# Patient Record
Sex: FEMALE | Race: WHITE | NOT HISPANIC OR LATINO | Employment: FULL TIME | URBAN - METROPOLITAN AREA
[De-identification: names, ages, dates, MRNs, and addresses within clinical notes are randomized per-mention and may not be internally consistent; named-entity substitution may affect disease eponyms.]

---

## 2017-04-18 DIAGNOSIS — Z01.812 PRE-OPERATIVE LABORATORY EXAMINATION: ICD-10-CM

## 2017-04-18 DIAGNOSIS — Z01.83 ENCOUNTER FOR BLOOD TYPING: ICD-10-CM

## 2017-04-18 LAB
ABO GROUP BLD: NORMAL
ALBUMIN SERPL BCP-MCNC: 4.2 G/DL (ref 3.2–4.9)
ALBUMIN/GLOB SERPL: 1.6 G/DL
ALP SERPL-CCNC: 111 U/L (ref 30–99)
ALT SERPL-CCNC: 17 U/L (ref 2–50)
ANION GAP SERPL CALC-SCNC: 7 MMOL/L (ref 0–11.9)
APTT PPP: 22.6 SEC (ref 24.7–36)
AST SERPL-CCNC: 19 U/L (ref 12–45)
BASOPHILS # BLD AUTO: 0.7 % (ref 0–1.8)
BASOPHILS # BLD: 0.04 K/UL (ref 0–0.12)
BILIRUB SERPL-MCNC: 0.5 MG/DL (ref 0.1–1.5)
BLD GP AB SCN SERPL QL: NORMAL
BUN SERPL-MCNC: 10 MG/DL (ref 8–22)
CALCIUM SERPL-MCNC: 9.5 MG/DL (ref 8.5–10.5)
CHLORIDE SERPL-SCNC: 107 MMOL/L (ref 96–112)
CO2 SERPL-SCNC: 25 MMOL/L (ref 20–33)
CREAT SERPL-MCNC: 0.81 MG/DL (ref 0.5–1.4)
EOSINOPHIL # BLD AUTO: 0.17 K/UL (ref 0–0.51)
EOSINOPHIL NFR BLD: 2.9 % (ref 0–6.9)
ERYTHROCYTE [DISTWIDTH] IN BLOOD BY AUTOMATED COUNT: 45.4 FL (ref 35.9–50)
GFR SERPL CREATININE-BSD FRML MDRD: >60 ML/MIN/1.73 M 2
GLOBULIN SER CALC-MCNC: 2.6 G/DL (ref 1.9–3.5)
GLUCOSE SERPL-MCNC: 96 MG/DL (ref 65–99)
HCG SERPL QL: NEGATIVE
HCT VFR BLD AUTO: 39.1 % (ref 37–47)
HGB BLD-MCNC: 13.3 G/DL (ref 12–16)
IMM GRANULOCYTES # BLD AUTO: 0.01 K/UL (ref 0–0.11)
IMM GRANULOCYTES NFR BLD AUTO: 0.2 % (ref 0–0.9)
INR PPP: 0.91 (ref 0.87–1.13)
LYMPHOCYTES # BLD AUTO: 1.53 K/UL (ref 1–4.8)
LYMPHOCYTES NFR BLD: 25.9 % (ref 22–41)
MCH RBC QN AUTO: 32.5 PG (ref 27–33)
MCHC RBC AUTO-ENTMCNC: 34 G/DL (ref 33.6–35)
MCV RBC AUTO: 95.6 FL (ref 81.4–97.8)
MONOCYTES # BLD AUTO: 0.44 K/UL (ref 0–0.85)
MONOCYTES NFR BLD AUTO: 7.4 % (ref 0–13.4)
NEUTROPHILS # BLD AUTO: 3.72 K/UL (ref 2–7.15)
NEUTROPHILS NFR BLD: 62.9 % (ref 44–72)
NRBC # BLD AUTO: 0 K/UL
NRBC BLD AUTO-RTO: 0 /100 WBC
PLATELET # BLD AUTO: 235 K/UL (ref 164–446)
PMV BLD AUTO: 10.7 FL (ref 9–12.9)
POTASSIUM SERPL-SCNC: 3.6 MMOL/L (ref 3.6–5.5)
PROT SERPL-MCNC: 6.8 G/DL (ref 6–8.2)
PROTHROMBIN TIME: 12.5 SEC (ref 12–14.6)
RBC # BLD AUTO: 4.09 M/UL (ref 4.2–5.4)
RH BLD: NORMAL
SODIUM SERPL-SCNC: 139 MMOL/L (ref 135–145)
WBC # BLD AUTO: 5.9 K/UL (ref 4.8–10.8)

## 2017-04-18 PROCEDURE — 86850 RBC ANTIBODY SCREEN: CPT

## 2017-04-18 PROCEDURE — 36415 COLL VENOUS BLD VENIPUNCTURE: CPT

## 2017-04-18 PROCEDURE — 85730 THROMBOPLASTIN TIME PARTIAL: CPT

## 2017-04-18 PROCEDURE — 84703 CHORIONIC GONADOTROPIN ASSAY: CPT

## 2017-04-18 PROCEDURE — 80053 COMPREHEN METABOLIC PANEL: CPT

## 2017-04-18 PROCEDURE — 85025 COMPLETE CBC W/AUTO DIFF WBC: CPT

## 2017-04-18 PROCEDURE — 86901 BLOOD TYPING SEROLOGIC RH(D): CPT

## 2017-04-18 PROCEDURE — 85610 PROTHROMBIN TIME: CPT

## 2017-04-18 PROCEDURE — 86900 BLOOD TYPING SEROLOGIC ABO: CPT

## 2017-04-18 RX ORDER — M-VIT,TX,IRON,MINS/CALC/FOLIC 27MG-0.4MG
1 TABLET ORAL DAILY
COMMUNITY

## 2017-04-18 RX ORDER — TAMOXIFEN CITRATE 20 MG/1
20 TABLET ORAL EVERY EVENING
COMMUNITY

## 2017-04-20 ENCOUNTER — HOSPITAL ENCOUNTER (OUTPATIENT)
Facility: MEDICAL CENTER | Age: 37
End: 2017-04-20
Attending: SPECIALIST | Admitting: SPECIALIST
Payer: COMMERCIAL

## 2017-04-20 VITALS
SYSTOLIC BLOOD PRESSURE: 100 MMHG | HEIGHT: 64 IN | WEIGHT: 137.79 LBS | BODY MASS INDEX: 23.52 KG/M2 | RESPIRATION RATE: 16 BRPM | HEART RATE: 94 BPM | OXYGEN SATURATION: 97 % | TEMPERATURE: 99.3 F | DIASTOLIC BLOOD PRESSURE: 76 MMHG

## 2017-04-20 PROBLEM — R97.1 ELEVATED CANCER ANTIGEN 125 (CA 125): Status: ACTIVE | Noted: 2017-04-20

## 2017-04-20 PROBLEM — R19.01 RIGHT UPPER QUADRANT ABDOMINAL MASS: Status: ACTIVE | Noted: 2017-04-20

## 2017-04-20 LAB
ABO GROUP BLD: NORMAL
ANION GAP SERPL CALC-SCNC: 13 MMOL/L (ref 0–11.9)
BUN SERPL-MCNC: 10 MG/DL (ref 8–22)
CALCIUM SERPL-MCNC: 9.9 MG/DL (ref 8.5–10.5)
CHLORIDE SERPL-SCNC: 107 MMOL/L (ref 96–112)
CO2 SERPL-SCNC: 20 MMOL/L (ref 20–33)
CREAT SERPL-MCNC: 0.55 MG/DL (ref 0.5–1.4)
GFR SERPL CREATININE-BSD FRML MDRD: >60 ML/MIN/1.73 M 2
GLUCOSE SERPL-MCNC: 81 MG/DL (ref 65–99)
POTASSIUM SERPL-SCNC: 3.8 MMOL/L (ref 3.6–5.5)
SODIUM SERPL-SCNC: 140 MMOL/L (ref 135–145)

## 2017-04-20 PROCEDURE — 110371 HCHG SHELL REV 272: Performed by: SPECIALIST

## 2017-04-20 PROCEDURE — 700101 HCHG RX REV CODE 250

## 2017-04-20 PROCEDURE — 501664 HCHG TUBING, FILTER STRYKER: Performed by: SPECIALIST

## 2017-04-20 PROCEDURE — 160035 HCHG PACU - 1ST 60 MINS PHASE I: Performed by: SPECIALIST

## 2017-04-20 PROCEDURE — 500025 HCHG ARMREST, CRADLE FOAM: Performed by: SPECIALIST

## 2017-04-20 PROCEDURE — 88305 TISSUE EXAM BY PATHOLOGIST: CPT

## 2017-04-20 PROCEDURE — 160025 RECOVERY II MINUTES (STATS): Performed by: SPECIALIST

## 2017-04-20 PROCEDURE — 80048 BASIC METABOLIC PNL TOTAL CA: CPT

## 2017-04-20 PROCEDURE — 160009 HCHG ANES TIME/MIN: Performed by: SPECIALIST

## 2017-04-20 PROCEDURE — 160042 HCHG SURGERY MINUTES - EA ADDL 1 MIN LEVEL 5: Performed by: SPECIALIST

## 2017-04-20 PROCEDURE — 160002 HCHG RECOVERY MINUTES (STAT): Performed by: SPECIALIST

## 2017-04-20 PROCEDURE — 110382 HCHG SHELL REV 271: Performed by: SPECIALIST

## 2017-04-20 PROCEDURE — 51798 US URINE CAPACITY MEASURE: CPT

## 2017-04-20 PROCEDURE — 502714 HCHG ROBOTIC SURGERY SERVICES: Performed by: SPECIALIST

## 2017-04-20 PROCEDURE — 160046 HCHG PACU - 1ST 60 MINS PHASE II: Performed by: SPECIALIST

## 2017-04-20 PROCEDURE — 500864 HCHG NEEDLE, SPINAL 18G: Performed by: SPECIALIST

## 2017-04-20 PROCEDURE — A4606 OXYGEN PROBE USED W OXIMETER: HCPCS | Performed by: SPECIALIST

## 2017-04-20 PROCEDURE — 700111 HCHG RX REV CODE 636 W/ 250 OVERRIDE (IP)

## 2017-04-20 PROCEDURE — 501582 HCHG TROCAR, THRD BLADED: Performed by: SPECIALIST

## 2017-04-20 PROCEDURE — 500854 HCHG NEEDLE, INSUFFLATION FOR STEP: Performed by: SPECIALIST

## 2017-04-20 PROCEDURE — 88307 TISSUE EXAM BY PATHOLOGIST: CPT

## 2017-04-20 PROCEDURE — A9270 NON-COVERED ITEM OR SERVICE: HCPCS

## 2017-04-20 PROCEDURE — 160036 HCHG PACU - EA ADDL 30 MINS PHASE I: Performed by: SPECIALIST

## 2017-04-20 PROCEDURE — 500700 HCHG HEMOCLIP, SMALL (RED): Performed by: SPECIALIST

## 2017-04-20 PROCEDURE — 502240 HCHG MISC OR SUPPLY RC 0272: Performed by: SPECIALIST

## 2017-04-20 PROCEDURE — 160031 HCHG SURGERY MINUTES - 1ST 30 MINS LEVEL 5: Performed by: SPECIALIST

## 2017-04-20 PROCEDURE — 502779 HCHG SUTURE, QUILL: Performed by: SPECIALIST

## 2017-04-20 PROCEDURE — 501838 HCHG SUTURE GENERAL: Performed by: SPECIALIST

## 2017-04-20 PROCEDURE — 700102 HCHG RX REV CODE 250 W/ 637 OVERRIDE(OP)

## 2017-04-20 PROCEDURE — 160048 HCHG OR STATISTICAL LEVEL 1-5: Performed by: SPECIALIST

## 2017-04-20 PROCEDURE — 160047 HCHG PACU  - EA ADDL 30 MINS PHASE II: Performed by: SPECIALIST

## 2017-04-20 RX ORDER — HYDROMORPHONE HYDROCHLORIDE 2 MG/1
2-4 TABLET ORAL
Status: DISCONTINUED | OUTPATIENT
Start: 2017-04-20 | End: 2017-04-20

## 2017-04-20 RX ORDER — CELECOXIB 200 MG/1
CAPSULE ORAL
Status: COMPLETED
Start: 2017-04-20 | End: 2017-04-20

## 2017-04-20 RX ORDER — SODIUM CHLORIDE, SODIUM LACTATE, POTASSIUM CHLORIDE, CALCIUM CHLORIDE 600; 310; 30; 20 MG/100ML; MG/100ML; MG/100ML; MG/100ML
1000 INJECTION, SOLUTION INTRAVENOUS
Status: COMPLETED | OUTPATIENT
Start: 2017-04-20 | End: 2017-04-20

## 2017-04-20 RX ORDER — METOCLOPRAMIDE HYDROCHLORIDE 5 MG/ML
10 INJECTION INTRAMUSCULAR; INTRAVENOUS ONCE
Status: COMPLETED | OUTPATIENT
Start: 2017-04-20 | End: 2017-04-20

## 2017-04-20 RX ORDER — HYDROMORPHONE HYDROCHLORIDE 2 MG/1
TABLET ORAL
Status: COMPLETED
Start: 2017-04-20 | End: 2017-04-20

## 2017-04-20 RX ORDER — METOCLOPRAMIDE 10 MG/1
10 TABLET ORAL EVERY 6 HOURS PRN
Qty: 30 TAB | Refills: 0 | Status: SHIPPED | OUTPATIENT
Start: 2017-04-20

## 2017-04-20 RX ORDER — SCOLOPAMINE TRANSDERMAL SYSTEM 1 MG/1
PATCH, EXTENDED RELEASE TRANSDERMAL
Status: DISCONTINUED
Start: 2017-04-20 | End: 2017-04-20 | Stop reason: HOSPADM

## 2017-04-20 RX ORDER — ONDANSETRON 2 MG/ML
4 INJECTION INTRAMUSCULAR; INTRAVENOUS EVERY 4 HOURS PRN
Status: DISCONTINUED | OUTPATIENT
Start: 2017-04-20 | End: 2017-04-20 | Stop reason: HOSPADM

## 2017-04-20 RX ORDER — ONDANSETRON 4 MG/1
4 TABLET, ORALLY DISINTEGRATING ORAL EVERY 8 HOURS PRN
Qty: 30 TAB | Refills: 0 | Status: SHIPPED | OUTPATIENT
Start: 2017-04-20

## 2017-04-20 RX ORDER — HYDROMORPHONE HYDROCHLORIDE 2 MG/1
2-4 TABLET ORAL
Qty: 60 TAB | Refills: 0 | Status: SHIPPED | OUTPATIENT
Start: 2017-04-20

## 2017-04-20 RX ORDER — HYDROMORPHONE HYDROCHLORIDE 2 MG/1
4 TABLET ORAL
Status: DISCONTINUED | OUTPATIENT
Start: 2017-04-20 | End: 2017-04-20 | Stop reason: HOSPADM

## 2017-04-20 RX ORDER — DIPHENHYDRAMINE HYDROCHLORIDE 50 MG/ML
INJECTION INTRAMUSCULAR; INTRAVENOUS
Status: COMPLETED
Start: 2017-04-20 | End: 2017-04-20

## 2017-04-20 RX ORDER — KETOROLAC TROMETHAMINE 30 MG/ML
30 INJECTION, SOLUTION INTRAMUSCULAR; INTRAVENOUS EVERY 6 HOURS PRN
Status: DISCONTINUED | OUTPATIENT
Start: 2017-04-20 | End: 2017-04-20 | Stop reason: HOSPADM

## 2017-04-20 RX ORDER — LIDOCAINE HYDROCHLORIDE 10 MG/ML
INJECTION, SOLUTION INFILTRATION; PERINEURAL
Status: COMPLETED
Start: 2017-04-20 | End: 2017-04-20

## 2017-04-20 RX ORDER — BUPIVACAINE HYDROCHLORIDE AND EPINEPHRINE 2.5; 5 MG/ML; UG/ML
INJECTION, SOLUTION EPIDURAL; INFILTRATION; INTRACAUDAL; PERINEURAL
Status: DISCONTINUED | OUTPATIENT
Start: 2017-04-20 | End: 2017-04-20 | Stop reason: HOSPADM

## 2017-04-20 RX ORDER — METOCLOPRAMIDE HYDROCHLORIDE 5 MG/ML
INJECTION INTRAMUSCULAR; INTRAVENOUS
Status: COMPLETED
Start: 2017-04-20 | End: 2017-04-20

## 2017-04-20 RX ORDER — HYDROMORPHONE HYDROCHLORIDE 2 MG/1
2 TABLET ORAL
Status: DISCONTINUED | OUTPATIENT
Start: 2017-04-20 | End: 2017-04-20 | Stop reason: HOSPADM

## 2017-04-20 RX ORDER — ONDANSETRON 2 MG/ML
4 INJECTION INTRAMUSCULAR; INTRAVENOUS EVERY 6 HOURS PRN
Status: CANCELLED | OUTPATIENT
Start: 2017-04-20

## 2017-04-20 RX ORDER — ACETAMINOPHEN 500 MG
TABLET ORAL
Status: COMPLETED
Start: 2017-04-20 | End: 2017-04-20

## 2017-04-20 RX ORDER — HYDROMORPHONE HYDROCHLORIDE 2 MG/ML
1 INJECTION, SOLUTION INTRAMUSCULAR; INTRAVENOUS; SUBCUTANEOUS
Status: DISCONTINUED | OUTPATIENT
Start: 2017-04-20 | End: 2017-04-20 | Stop reason: HOSPADM

## 2017-04-20 RX ORDER — SCOLOPAMINE TRANSDERMAL SYSTEM 1 MG/1
1 PATCH, EXTENDED RELEASE TRANSDERMAL
Status: DISCONTINUED | OUTPATIENT
Start: 2017-04-20 | End: 2017-04-20 | Stop reason: HOSPADM

## 2017-04-20 RX ADMIN — DIPHENHYDRAMINE HYDROCHLORIDE 12.5 MG: 50 INJECTION INTRAMUSCULAR; INTRAVENOUS at 13:52

## 2017-04-20 RX ADMIN — SCOLOPAMINE TRANSDERMAL SYSTEM 1 PATCH: 1 PATCH, EXTENDED RELEASE TRANSDERMAL at 10:15

## 2017-04-20 RX ADMIN — HYDROMORPHONE HYDROCHLORIDE 2 MG: 2 TABLET ORAL at 19:30

## 2017-04-20 RX ADMIN — FENTANYL CITRATE 25 MCG: 50 INJECTION, SOLUTION INTRAMUSCULAR; INTRAVENOUS at 13:30

## 2017-04-20 RX ADMIN — DIPHENHYDRAMINE HYDROCHLORIDE 12.5 MG: 50 INJECTION INTRAMUSCULAR; INTRAVENOUS at 13:15

## 2017-04-20 RX ADMIN — CELECOXIB: 200 CAPSULE ORAL at 10:15

## 2017-04-20 RX ADMIN — ACETAMINOPHEN 1000 MG: 500 TABLET, FILM COATED ORAL at 10:15

## 2017-04-20 RX ADMIN — LIDOCAINE HYDROCHLORIDE: 10 INJECTION, SOLUTION INFILTRATION; PERINEURAL at 09:55

## 2017-04-20 RX ADMIN — METOCLOPRAMIDE HYDROCHLORIDE 10 MG: 5 INJECTION INTRAMUSCULAR; INTRAVENOUS at 16:08

## 2017-04-20 RX ADMIN — HYDROMORPHONE HYDROCHLORIDE 1 MG: 2 TABLET ORAL at 13:20

## 2017-04-20 RX ADMIN — METOCLOPRAMIDE 10 MG: 5 INJECTION, SOLUTION INTRAMUSCULAR; INTRAVENOUS at 16:08

## 2017-04-20 RX ADMIN — SCOPALAMINE 1 PATCH: 1 PATCH, EXTENDED RELEASE TRANSDERMAL at 10:15

## 2017-04-20 RX ADMIN — SODIUM CHLORIDE, SODIUM LACTATE, POTASSIUM CHLORIDE, CALCIUM CHLORIDE 1000 ML: 600; 310; 30; 20 INJECTION, SOLUTION INTRAVENOUS at 09:55

## 2017-04-20 RX ADMIN — HYDROMORPHONE HYDROCHLORIDE 25 MG: 2 TABLET ORAL at 13:40

## 2017-04-20 ASSESSMENT — PAIN SCALES - GENERAL
PAINLEVEL_OUTOF10: 3
PAINLEVEL_OUTOF10: 3
PAINLEVEL_OUTOF10: 2
PAINLEVEL_OUTOF10: 1
PAINLEVEL_OUTOF10: 2
PAINLEVEL_OUTOF10: 2

## 2017-04-20 NOTE — IP AVS SNAPSHOT
4/20/2017    Zaida Renny  [address] [place] [address]    Dear Zaida:    [facility] wants to ensure your discharge home is safe and you or your loved ones have had all of your questions answered regarding your care after you leave the hospital.    Below is a list of resources and contact information should you have any questions regarding your hospital stay, follow-up instructions, or active medical symptoms.    Questions or Concerns Regarding… Contact   Medical Questions Related to Your Discharge  (7 days a week, 8am-5pm) Contact a Nurse Care Coordinator   [phone]   Medical Questions Not Related to Your Discharge  (24 hours a day / 7 days a week)  Contact the Nurse Health Line   [phone]    Medications or Discharge Instructions Refer to your discharge packet   or contact your [facility] Primary Care Provider   [phone]   Follow-up Appointment(s) Schedule your appointment via [employer]   or contact Scheduling [phone]   Billing Review your statement via [employer]  or contact Billing [phone]   Medical Records Review your records via [employer]   or contact Medical Records [phone]     You may receive a telephone call within two days of discharge. This call is to make certain you understand your discharge instructions and have the opportunity to have any questions answered. You can also easily access your medical information, test results and upcoming appointments via the [employer] free online health management tool. You can learn more and sign up at [employer]. For assistance setting up your [employer] account, please call [phone].    Once again, we want to ensure your discharge home is safe and that you have a clear understanding of any next steps in your care. If you have any questions or concerns, please do not hesitate to contact us, we are here for you. Thank you for choosing [facility] for your healthcare needs.    Sincerely,    Your [facility] Healthcare Team

## 2017-04-20 NOTE — OR NURSING
Pt AOX4. Dressings CDI. Pt able to ambulate in hallway and tolerated pain. She tried to use the bathroom but unsuccessful for now. Pt back in bed. Family at bedside.      1555: Pt has c/o nausea and is asking for medication. She reports zofran to be not effective for her. Called DR. Alonso in OR and asked for order. He ordered Reglan 10mg IV once.     1720: Pt tried to void in the bathroom again but is still unable to go. Bladder scan result 128ml. Paged Dr. Traore to notify that pt is unable to void. Waiting for call back.     1812: Dr. Chin called back and is aware that pt is not able to void. He ordered to bolus pt with 1L LR. Give her another 2 hours and call him back if pt is still unsuccessful with voiding.

## 2017-04-20 NOTE — DISCHARGE INSTRUCTIONS
ACTIVITY: Rest and take it easy for the first 24 hours.  A responsible adult is recommended to remain with you during that time.  It is normal to feel sleepy.  We encourage you to not do anything that requires balance, judgment or coordination.    MILD FLU-LIKE SYMPTOMS ARE NORMAL. YOU MAY EXPERIENCE GENERALIZED MUSCLE ACHES, THROAT IRRITATION, HEADACHE AND/OR SOME NAUSEA.    FOR 24 HOURS DO NOT:  Drive, operate machinery or run household appliances.  Drink beer or alcoholic beverages.   Make important decisions or sign legal documents.    SPECIAL INSTRUCTIONS: *  1. No heavy lifting greater than 10 pounds for minimum 6 weeks  2. Nothing in vagina (ie no tampons, douching, intercourse) for minimum of 6 weeks  3. No driving while taking narcotics   4. Return to our office as directed and call to confirm appointment  Call our office [phone] if you develop any fevers, chills, nausea/vomiting, heavy vaginal bleeding, or redness, tenderness, and/or drainage from your wound, if you have persistent watery discharge while ambulating or stool draining from the vagina .  5. Showering is ok after shower make sure wound is dry. No tub baths or soaking of incision area until surgeon gives approval.  6. You may keep the wound dressing and change everyday. After 2 weeks from surgery you may keep the wound dressing off.   7. You may have vaginal spotting or light bleeding which is normal.  8. If you have not had a bowel movement for 2 days, please take over the counter Milk of Magnesium, 1 tablespoon every 4 hours. After 4 doses and if you still have not had a bowel movement, please call your doctor.   9. You may eat soft diet, such as soup, liquid, for day #1 and if tolerating you may resume your regular diet.**    DIET: To avoid nausea, slowly advance diet as tolerated, avoiding spicy or greasy foods for the first day.  Add more substantial food to your diet according to your physician's instructions.  Babies can be fed  formula or breast milk as soon as they are hungry.  INCREASE FLUIDS AND FIBER TO AVOID CONSTIPATION.    SURGICAL DRESSING/BATHING: *see above instructions    FOLLOW-UP APPOINTMENT:  A follow-up appointment should be arranged with your doctor.  Call to schedule.    You should CALL YOUR PHYSICIAN if you develop:  Fever greater than 101 degrees F.  Pain not relieved by medication, or persistent nausea or vomiting.  Excessive bleeding (blood soaking through dressing) or unexpected drainage from the wound.  Extreme redness or swelling around the incision site, drainage of pus or foul smelling drainage.  Inability to urinate or empty your bladder within 8 hours.  Problems with breathing or chest pain.    You should call 911 if you develop problems with breathing or chest pain.  If you are unable to contact your doctor or surgical center, you should go to the nearest emergency room or urgent care center.  Physician's telephone #: **Dr. Traore [phone]*    If any questions arise, call your doctor.  If your doctor is not available, please feel free to call the Surgical Center at [phone].  The Center is open Monday through Friday from 7AM to 7PM.  You can also call the [facility] HOTLINE open 24 hours/day, 7 days/week and speak to a nurse at [phone], or toll free at [phone].    A registered nurse may call you a few days after your surgery to see how you are doing after your procedure.    MEDICATIONS: Resume taking daily medication.  Take prescribed pain medication with food.  If no medication is prescribed, you may take non-aspirin pain medication if needed.  PAIN MEDICATION CAN BE VERY CONSTIPATING.  Take a stool softener or laxative such as senokot, pericolace, or milk of magnesia if needed.    Prescription given for Dilaudid, Reglan, & Zofran.  Last pain medication given at 1:40pm.    If your physician has prescribed pain medication that includes Acetaminophen (Tylenol), do not take additional  Acetaminophen (Tylenol) while taking the prescribed medication.    Depression / Suicide Risk    As you are discharged from this [facility] facility, it is important to learn how to keep safe from harming yourself.    Recognize the warning signs:  · Abrupt changes in personality, positive or negative- including increase in energy   · Giving away possessions  · Change in eating patterns- significant weight changes-  positive or negative  · Change in sleeping patterns- unable to sleep or sleeping all the time   · Unwillingness or inability to communicate  · Depression  · Unusual sadness, discouragement and loneliness  · Talk of wanting to die  · Neglect of personal appearance   · Rebelliousness- reckless behavior  · Withdrawal from people/activities they love  · Confusion- inability to concentrate     If you or a loved one observes any of these behaviors or has concerns about self-harm, here's what you can do:  · Talk about it- your feelings and reasons for harming yourself  · Remove any means that you might use to hurt yourself (examples: pills, rope, extension cords, firearm)  · Get professional help from the community (Mental Health, Substance Abuse, psychological counseling)  · Do not be alone:Call your Safe Contact- someone whom you trust who will be there for you.  · Call your local CRISIS HOTLINE 551-6480 or 469-905-2632  · Call your local Children's Mobile Crisis Response Team [place] (681) 334-2377 or www.[employer]  · Call the toll free National Suicide Prevention Hotlines   · National Suicide Prevention Lifeline 074-246-TDXN (4867)  · National Hope Line Network 800-SUICIDE (706-9941)

## 2017-04-20 NOTE — OR SURGEON
Immediate Post-Operative Note      PreOp Diagnosis: BRCA 2 deleterious mutation and elevated     PostOp Diagnosis: same    Procedure(s):  HYSTERECTOMY ROBOTIC XI-BSO WITH PERITONEAL BIOPSY - Wound Class: Clean Contaminated    Surgeon(s):  NICK Manzano M.D.    Anesthesiologist/Type of Anesthesia:  Anesthesiologist: Zachary Alonso M.D./General    Surgical Staff:  Assistant: AG Hercules  Circulator: Renetta Isabel R.N.  Relief Circulator: Lamin Alvarado R.N.  Scrub Person: tSacy Vazquez    Specimen: uterus, cervix, right and left tubes and ovaries, peritoneal biopsy    Estimated Blood Loss: 25    Findings: pelvic endometriosis, nodules x 2 on right diaphragmatic peritoneum, normal right and left tubes and ovaries.   No evidence of metastatic disease in the pelvis and upper abdomen.    Complications: none        4/20/2017 1:03 PM Juan Parks

## 2017-04-20 NOTE — OP REPORT
DATE OF SERVICE:  04/20/2017    PREOPERATIVE DIAGNOSES:  1.  Deleterious BRCA2 mutation.  2.  Elevated CA-125.  3.  History of breast cancer.    POSTOPERATIVE DIAGNOSES:  1.  BRCA2 deleterious mutation.  2.  Elevated CA-125.  3.  Endometriosis.    PROCEDURE PERFORMED:  Robotic-assisted hysterectomy with bilateral   salpingo-oophorectomy.    SURGEON:  Kike Traore MD    ASSISTANT:  Juan Parks M.D. and. Jacque Casas PA-C.    ANESTHESIOLOGIST:  Zachary Alonso MD.    SPECIMEN SENT OFF TO PATHOLOGY:  Uterus, cervix, right and left tubes and   ovaries and a right diaphragmatic peritoneal biopsy.    ESTIMATED BLOOD LOSS:  25 mL    FINDINGS:  There was no evidence of malignant disease in the pelvis, upper   abdomen and stomach.  The surface of the liver was smooth and there were 2   small benign appearing nodules on the surface of the right diaphragmatic   peritoneum.  One of them was resected and sent off to pathology for permanent   evaluation.  Both right and left tubes and ovaries appeared normal with no   evidence of any malignant disease on the surface of the tubes or the ovaries.    There was a small patch of endometriosis, maybe 0.5 in the area, which was   approximately 0.5 cm in diameter with 2 small nodules less than 2 mm each.    There was no puckering of the uterosacral ligaments.  There was no adhesive   disease in the pelvis secondary to endometriosis.  The appendix appeared   normal and the large bowel and surface of the stomach was also normal in   appearance.  There were no complications.    PROCEDURE IN DETAIL:  The patient was seen in the preoperative waiting area.    Risks, benefits and alternatives of the procedure were reviewed with the   patient.  The patient agreed to proceed with surgery.  The patient was taken   to the operating room where she was placed in the supine position and general   endotracheal anesthesia was administered.  Once _____ was deemed to be   adequate, the  patient was then prepped and draped in usual sterile fashion and   placed in low dorsal lithotomy position and a Martell catheter was placed in   the bladder using sterile technique.  Then, an 8 mm skin incision was made at   the umbilicus after the patient safety timeout and a Veress needle was   introduced into the intraabdominal cavity.  Intraperitoneal placement was   confirmed with a drop in CO2 pressure and double click on the Veress needle   and the abdomen was insufflated with approximately 3 liters of CO2 gas.  Then,   an 8 mm operative port was placed into the intraabdominal cavity.  Under   direct visualization, we did a lower pelvic port placement with 2 ports on the   left lower quadrant and 1 port on the right lower quadrant.  Then, the robot   was docked and under direct visualization, the instruments were placed into   the pelvis.    We took a laparoscopic Maryland forceps and an EndoShears and resected the   nodule in the right diaphragmatic peritoneum and sent off to pathology for   permanent evaluation.    Once instruments were in the pelvis and placed under direct visualization, the   left retroperitoneal space was entered and the peritoneal incision was   extended anteroposteriorly along the ovarian vessels on the left hand side.    The left pararectal space was developed and the ureter was identified and the   ovarian vessels were then isolated.  The ovarian vessel was then clamped,   cauterized and transected with the Maryland bipolar forceps and the posterior   leaf of the broad ligament was taken down all the way down to the uterosacral   ligament and the uterosacral ligament was then transected.  The incision was   extended anteriorly all the way up to the round ligament on the left hand side   and the peritoneal incision was extended anteriorly and the bladder was   dissected off the anterior aspect of the uterus using the monopolar spatula   cautery.  Then, attention was turned to the right  hand side in a similar   fashion, the right retroperitoneal space was entered.  The peritoneal incision   was extended anteroposteriorly along the ovarian vessels and the right   pararectal space was developed and the ureter was identified.  The ovarian   vessels were then isolated and noted to be away from the ureter and the   bipolar cautery was utilized to cauterize the ovarian vessels on the right   hand side and transected with a spatula monopolar cautery.    Then the peritoneal incision was extended anteriorly and the round ligament   was transected on the right hand side and hemostasis was deemed to be   adequate.  The peritoneal incision was then extended anteriorly and the   bladder was dissected off the anterior aspect of the uterus.  Then, both   uterine arteries were isolated, clamped, cauterized and transected with the   bipolar cautery and the uterine vessel was then transected and serial   parametrial bites were taken all the way down to the level of the bladder   pillar.  Then, the bladder pillar on the right and left hand side was clamped,   cauterized, and transected and the bladder was dissected off the anterior   aspect of the uterus all the way up to the level of the top of the vagina.    Then, both uterosacral ligaments were then clamped, cauterized and transected   and the vagina was entered posteriorly on the left hand side.  The vaginal   incision was then extended in a circumferential fashion and the uterus,   cervix, right and left tubes and ovaries were amputated off the upper vagina   and delivered to the vagina and sent off to pathology for permanent   evaluation.  The vaginal cuff was then closed with an 0 Quill in a running   fashion in 2 layers and sutured in the center.  The pelvis was then irrigated.    Excellent hemostasis was noted.  The instruments were removed from the   abdomen and the robot was undocked.  The abdomen was exsufflated and 30 mL of   0.5% Marcaine with  epinephrine was injected at the surgical sites.  The skin   incisions were then closed with a 2-0 Monocryl in a subcuticular fashion.  The   sponges were removed from the vagina.  The Martell catheter was removed.  This   patient was reversed from general anesthesia and taken to recovery room in   stable condition.  Sponge, lap, needle, instrument count were correct x2.       ____________________________________     NICK Webber / GASTON    DD:  04/20/2017 13:26:31  DT:  04/20/2017 14:00:24    D#:  [id number]  Job#:  [id number]

## 2017-04-20 NOTE — IP AVS SNAPSHOT
" Home Care Instructions                                                                                                                Name:Zaida Lawson  Medical Record Number:[id number]  CSN: [id number]    YOB: 1980   Age: 36 y.o.  Sex: female  HT:1.626 m (5' 4\") WT: 62.5 kg (137 lb 12.6 oz)          Admit Date: 4/20/2017     Discharge Date:   Today's Date: 4/20/2017  Attending Doctor:  Kike Traore M.D.                  Allergies:  Ciprofloxacin; Gluten meal; Hydrocodone; Oxycodone; Tramadol; and Wheat bran                Discharge Instructions         ACTIVITY: Rest and take it easy for the first 24 hours.  A responsible adult is recommended to remain with you during that time.  It is normal to feel sleepy.  We encourage you to not do anything that requires balance, judgment or coordination.    MILD FLU-LIKE SYMPTOMS ARE NORMAL. YOU MAY EXPERIENCE GENERALIZED MUSCLE ACHES, THROAT IRRITATION, HEADACHE AND/OR SOME NAUSEA.    FOR 24 HOURS DO NOT:  Drive, operate machinery or run household appliances.  Drink beer or alcoholic beverages.   Make important decisions or sign legal documents.    SPECIAL INSTRUCTIONS: *  1. No heavy lifting greater than 10 pounds for minimum 6 weeks  2. Nothing in vagina (ie no tampons, douching, intercourse) for minimum of 6 weeks  3. No driving while taking narcotics   4. Return to our office as directed and call to confirm appointment  Call our office [phone] if you develop any fevers, chills, nausea/vomiting, heavy vaginal bleeding, or redness, tenderness, and/or drainage from your wound, if you have persistent watery discharge while ambulating or stool draining from the vagina .  5. Showering is ok after shower make sure wound is dry. No tub baths or soaking of incision area until surgeon gives approval.  6. You may keep the wound dressing and change everyday. After 2 weeks from surgery you may keep the wound dressing off.   7. You may have vaginal spotting or light " bleeding which is normal.  8. If you have not had a bowel movement for 2 days, please take over the counter Milk of Magnesium, 1 tablespoon every 4 hours. After 4 doses and if you still have not had a bowel movement, please call your doctor.   9. You may eat soft diet, such as soup, liquid, for day #1 and if tolerating you may resume your regular diet.**    DIET: To avoid nausea, slowly advance diet as tolerated, avoiding spicy or greasy foods for the first day.  Add more substantial food to your diet according to your physician's instructions.  Babies can be fed formula or breast milk as soon as they are hungry.  INCREASE FLUIDS AND FIBER TO AVOID CONSTIPATION.    SURGICAL DRESSING/BATHING: *see above instructions    FOLLOW-UP APPOINTMENT:  A follow-up appointment should be arranged with your doctor.  Call to schedule.    You should CALL YOUR PHYSICIAN if you develop:  Fever greater than 101 degrees F.  Pain not relieved by medication, or persistent nausea or vomiting.  Excessive bleeding (blood soaking through dressing) or unexpected drainage from the wound.  Extreme redness or swelling around the incision site, drainage of pus or foul smelling drainage.  Inability to urinate or empty your bladder within 8 hours.  Problems with breathing or chest pain.    You should call 911 if you develop problems with breathing or chest pain.  If you are unable to contact your doctor or surgical center, you should go to the nearest emergency room or urgent care center.  Physician's telephone #: **Dr. Traore [phone]*    If any questions arise, call your doctor.  If your doctor is not available, please feel free to call the Surgical Center at [phone].  The Center is open Monday through Friday from 7AM to 7PM.  You can also call the [employer] HOTLINE open 24 hours/day, 7 days/week and speak to a nurse at [phone], or toll free at [phone].    A registered nurse may call you a few days after your surgery to see how  you are doing after your procedure.    MEDICATIONS: Resume taking daily medication.  Take prescribed pain medication with food.  If no medication is prescribed, you may take non-aspirin pain medication if needed.  PAIN MEDICATION CAN BE VERY CONSTIPATING.  Take a stool softener or laxative such as senokot, pericolace, or milk of magnesia if needed.    Prescription given for Dilaudid, Reglan, & Zofran.  Last pain medication given at 1:40pm.    If your physician has prescribed pain medication that includes Acetaminophen (Tylenol), do not take additional Acetaminophen (Tylenol) while taking the prescribed medication.    Depression / Suicide Risk    As you are discharged from this [facility] facility, it is important to learn how to keep safe from harming yourself.    Recognize the warning signs:  · Abrupt changes in personality, positive or negative- including increase in energy   · Giving away possessions  · Change in eating patterns- significant weight changes-  positive or negative  · Change in sleeping patterns- unable to sleep or sleeping all the time   · Unwillingness or inability to communicate  · Depression  · Unusual sadness, discouragement and loneliness  · Talk of wanting to die  · Neglect of personal appearance   · Rebelliousness- reckless behavior  · Withdrawal from people/activities they love  · Confusion- inability to concentrate     If you or a loved one observes any of these behaviors or has concerns about self-harm, here's what you can do:  · Talk about it- your feelings and reasons for harming yourself  · Remove any means that you might use to hurt yourself (examples: pills, rope, extension cords, firearm)  · Get professional help from the community (Mental Health, Substance Abuse, psychological counseling)  · Do not be alone:Call your Safe Contact- someone whom you trust who will be there for you.  · Call your local CRISIS HOTLINE 965-5466 or 859-696-0155  · Call your local Children's Mobile  Crisis Response Team [place] (490) 168-6084 or www.[employer].com  · Call the toll free National Suicide Prevention Hotlines   · National Suicide Prevention Lifeline 832-163-JYXG (8367)  · National Hope Line Network 800-SUICIDE (352-8256)       Medication List      START taking these medications        Instructions    Morning Afternoon Evening Bedtime    HYDROmorphone 2 MG Tabs   Last time this was given:  25 mg on 4/20/2017  1:40 PM   Commonly known as:  DILAUDID        Take 1-2 Tabs by mouth every 3 hours as needed.   Dose:  2-4 mg                        metoclopramide 10 MG Tabs   Commonly known as:  REGLAN        Take 1 Tab by mouth every 6 hours as needed.   Dose:  10 mg                        ondansetron 4 MG Tbdp   Commonly known as:  ZOFRAN ODT        Take 1 Tab by mouth every 8 hours as needed for Nausea/Vomiting.   Dose:  4 mg                          CONTINUE taking these medications        Instructions    Morning Afternoon Evening Bedtime    NEXPLANON 68 MG Impl implant   Generic drug:  etonogestrel        Inject 1 Each as instructed Once.   Dose:  1 Each                        tamoxifen 20 MG tablet   Commonly known as:  NOLVADEX        Take 20 mg by mouth every evening.   Dose:  20 mg                        therapeutic multivitamin-minerals Tabs        Take 1 Tab by mouth every day.   Dose:  1 Tab                             Where to Get Your Medications      You can get these medications from any pharmacy     Bring a paper prescription for each of these medications    - HYDROmorphone 2 MG Tabs  - metoclopramide 10 MG Tabs  - ondansetron 4 MG Tbdp            Medication Information     Above and/or attached are the medications your physician expects you to take upon discharge. Review all of your home medications and newly ordered medications with your doctor and/or pharmacist. Follow medication instructions as directed by your doctor and/or pharmacist. Please keep your medication list with you  and share with your physician. Update the information when medications are discontinued, doses are changed, or new medications (including over-the-counter products) are added; and carry medication information at all times in the event of emergency situations.        Resources     Quit Smoking / Tobacco Use:    I understand the use of any tobacco products increases my chance of suffering from future heart disease or stroke and could cause other illnesses which may shorten my life. Quitting the use of tobacco products is the single most important thing I can do to improve my health. For further information on smoking / tobacco cessation call a Toll Free Quit Line at 1-752.820.3725 (*[employer] [place]) or 1-404.661.8992 (American Lung Association) or you can access the web based program at www.[employer].org.    [place] Tobacco Users Help Line:  (896) 536-3508       Toll Free: 1-521.289.9693  Quit Tobacco Program [facility] (345)521-0558    Crisis Hotline:    [place] Crisis Hotline:  7-094-BLRPQLL or 1-424.397.8300    [place] Crisis Hotline:    1-918.976.4829 or 451-668-9978    Discharge Survey:   Thank you for choosing [facility]. We hope we did everything we could to make your hospital stay a pleasant one. You may be receiving a survey and we would appreciate your time and participation in answering the questions. Your input is very valuable to us in our efforts to improve our service to our patients and their families.            Signatures     My signature on this form indicates that:    1. I acknowledge receipt and understanding of these Home Care Instruction.  2. My questions regarding this information have been answered to my satisfaction.  3. I have formulated a plan with my discharge nurse to obtain my prescribed medications for home.    __________________________________      __________________________________                   Patient Signature                                  Guardian/Responsible Adult Signature      __________________________________                 __________       ________                       Nurse Signature                                               Date                 Time

## 2017-04-20 NOTE — PROGRESS NOTES
The Medication Reconciliation process has been completed by interviewing the patient    Allergies have been reviewed  Antibiotic use in 30 days - NONE    Home Pharmacy:  [facility]

## 2017-04-21 NOTE — OR NURSING
Pt able to void approx. 300cc clear straw colored urine at approx. 1920.  Pt comfortable and ready for discharge.

## (undated) DEVICE — TUBING LAPAROSCOPIC PLUME DEVICE (10EA/CA)

## (undated) DEVICE — ROBOTIC SURGERY SERVICES

## (undated) DEVICE — LACTATED RINGERS INJ 1000 ML - (14EA/CA 60CA/PF)

## (undated) DEVICE — HEAD HOLDER JUNIOR/ADULT

## (undated) DEVICE — TUBING CLEARLINK DUO-VENT - C-FLO (48EA/CA)

## (undated) DEVICE — NEEDLE INSUFFLATION FOR STEP - (12/BX)

## (undated) DEVICE — MASK ANESTHESIA ADULT  - (100/CA)

## (undated) DEVICE — NEEDLE DRIVER LARGE DA VINCI 10X'S REUSABLE

## (undated) DEVICE — SUTURE 3-0 MONOCRYL PLUS PS-1 - 27 INCH (36/BX)

## (undated) DEVICE — DRAPE ARM  BOX OF 20

## (undated) DEVICE — NEEDLE DRIVER MEGA SUTURECUT DA VINCI 15X'S REUSABLE

## (undated) DEVICE — SODIUM CHL IRRIGATION 0.9% 1000ML (12EA/CA)

## (undated) DEVICE — SUTURE 0 VICRYL PLUS CT-2 - 27 INCH (36/BX)

## (undated) DEVICE — ELECTRODE 850 FOAM ADHESIVE - HYDROGEL RADIOTRNSPRNT (50/PK)

## (undated) DEVICE — GLOVE BIOGEL SZ 6 PF LATEX - (50EA/BX 4BX/CA)

## (undated) DEVICE — PROTECTOR ULNA NERVE - (36PR/CA)

## (undated) DEVICE — FORCEPS MARYLAND BIPOLAR DA VINCI 10X'S REUSABLE

## (undated) DEVICE — GLOVE BIOGEL PI INDICATOR SZ 6.5 SURGICAL PF LF - (50/BX 4BX/CA)

## (undated) DEVICE — ARMREST CRADLE FOAM - (2PR/PK 12PR/CA)

## (undated) DEVICE — GOWN SURGEONS X-LARGE - DISP. (30/CA)

## (undated) DEVICE — DRAPE COLUMN  BOX OF 20

## (undated) DEVICE — TUBE CONNECT SUCTION CLEAR 120 X 1/4" (50EA/CA)"

## (undated) DEVICE — SUTURE QUILL 0 PDO 14X14 - (12/BX)

## (undated) DEVICE — CANISTER SUCTION 3000ML MECHANICAL FILTER AUTO SHUTOFF MEDI-VAC NONSTERILE LF DISP  (40EA/CA)

## (undated) DEVICE — KIT ANESTHESIA W/CIRCUIT & 3/LT BAG W/FILTER (20EA/CA)

## (undated) DEVICE — FORCEPS PROGRASP DA VINCI 10X'S REUSABLE

## (undated) DEVICE — CLIP SM INTNL HRZN TI ESCP LGT - (24EA/PK 25PK/BX)

## (undated) DEVICE — SPATULA PERMANENT CAUTERY DA VINCI 10X'S REUSABLE

## (undated) DEVICE — PACK GYN DAVINCI (2EA/CA)

## (undated) DEVICE — SET LEADWIRE 5 LEAD BEDSIDE DISPOSABLE ECG (1SET OF 5/EA)

## (undated) DEVICE — SUTURE GENERAL

## (undated) DEVICE — SLEEVE, VASO, THIGH, MED

## (undated) DEVICE — SET SUCTION/IRRIGATION WITH DISPOSABLE TIP (6/CA )PART #0250-070-520 IS A SUB

## (undated) DEVICE — PAD OR TABLE DA VINCI 2IN X 20IN X 72IN - (12EA/CA)

## (undated) DEVICE — GOWN WARMING STANDARD FLEX - (30/CA)

## (undated) DEVICE — ELECTRODE DUAL RETURN W/ CORD - (50/PK)

## (undated) DEVICE — SYRINGE ASEPTO - (50EA/CA

## (undated) DEVICE — SYRINGE 12 CC LUER TIP - (80/BX) OBSOLETE ITEM

## (undated) DEVICE — KIT ROOM DECONTAMINATION

## (undated) DEVICE — SEAL 5MM-8MM UNIVERSAL  BOX OF 10

## (undated) DEVICE — TROCAR Z THREAD 12 X 100 - BLADED (6/BX)

## (undated) DEVICE — SYRINGE 30 ML LS (56/BX)

## (undated) DEVICE — TUBE E-T HI-LO CUFF 7.0MM (10EA/PK)

## (undated) DEVICE — LEAD SET 6 DISP. EKG NIHON KOHDEN

## (undated) DEVICE — SUCTION INSTRUMENT YANKAUER BULBOUS TIP W/O VENT (50EA/CA)

## (undated) DEVICE — NEEDLE SPINAL NON-SAFETY 18 GA X 3 IN (25EA/BX)

## (undated) DEVICE — WATER IRRIG. STER. 1500 ML - (9/CA)

## (undated) DEVICE — SET EXTENSION WITH 2 PORTS (48EA/CA) ***PART #2C8610 IS A SUBSTITUTE*****

## (undated) DEVICE — SENSOR SPO2 NEO LNCS ADHESIVE (20/BX) SEE USER NOTES